# Patient Record
Sex: FEMALE | Race: WHITE | NOT HISPANIC OR LATINO | URBAN - METROPOLITAN AREA
[De-identification: names, ages, dates, MRNs, and addresses within clinical notes are randomized per-mention and may not be internally consistent; named-entity substitution may affect disease eponyms.]

---

## 2022-05-12 ENCOUNTER — OFFICE VISIT (OUTPATIENT)
Dept: PODIATRY | Facility: CLINIC | Age: 52
End: 2022-05-12
Payer: COMMERCIAL

## 2022-05-12 DIAGNOSIS — M72.2 PLANTAR FASCIAL FIBROMATOSIS OF BOTH FEET: Primary | ICD-10-CM

## 2022-05-12 DIAGNOSIS — M79.89 PALPABLE MASS OF SOFT TISSUE OF FOOT: ICD-10-CM

## 2022-05-12 PROCEDURE — 99203 OFFICE O/P NEW LOW 30 MIN: CPT | Mod: 25,S$GLB,, | Performed by: PODIATRIST

## 2022-05-12 PROCEDURE — 1160F RVW MEDS BY RX/DR IN RCRD: CPT | Mod: CPTII,S$GLB,, | Performed by: PODIATRIST

## 2022-05-12 PROCEDURE — 20550 PR INJECT TENDON SHEATH/LIGAMENT: ICD-10-PCS | Mod: RT,S$GLB,, | Performed by: PODIATRIST

## 2022-05-12 PROCEDURE — 1160F PR REVIEW ALL MEDS BY PRESCRIBER/CLIN PHARMACIST DOCUMENTED: ICD-10-PCS | Mod: CPTII,S$GLB,, | Performed by: PODIATRIST

## 2022-05-12 PROCEDURE — 1159F MED LIST DOCD IN RCRD: CPT | Mod: CPTII,S$GLB,, | Performed by: PODIATRIST

## 2022-05-12 PROCEDURE — 20550 NJX 1 TENDON SHEATH/LIGAMENT: CPT | Mod: RT,S$GLB,, | Performed by: PODIATRIST

## 2022-05-12 PROCEDURE — 99999 PR PBB SHADOW E&M-NEW PATIENT-LVL II: ICD-10-PCS | Mod: PBBFAC,,, | Performed by: PODIATRIST

## 2022-05-12 PROCEDURE — 99999 PR PBB SHADOW E&M-NEW PATIENT-LVL II: CPT | Mod: PBBFAC,,, | Performed by: PODIATRIST

## 2022-05-12 PROCEDURE — 99203 PR OFFICE/OUTPT VISIT, NEW, LEVL III, 30-44 MIN: ICD-10-PCS | Mod: 25,S$GLB,, | Performed by: PODIATRIST

## 2022-05-12 PROCEDURE — 1159F PR MEDICATION LIST DOCUMENTED IN MEDICAL RECORD: ICD-10-PCS | Mod: CPTII,S$GLB,, | Performed by: PODIATRIST

## 2022-05-12 RX ORDER — DEXAMETHASONE SODIUM PHOSPHATE 4 MG/ML
4 INJECTION, SOLUTION INTRA-ARTICULAR; INTRALESIONAL; INTRAMUSCULAR; INTRAVENOUS; SOFT TISSUE
Status: DISCONTINUED | OUTPATIENT
Start: 2022-05-12 | End: 2022-05-12 | Stop reason: HOSPADM

## 2022-05-12 RX ORDER — TRIAMCINOLONE ACETONIDE 40 MG/ML
40 INJECTION, SUSPENSION INTRA-ARTICULAR; INTRAMUSCULAR
Status: DISCONTINUED | OUTPATIENT
Start: 2022-05-12 | End: 2022-05-12 | Stop reason: HOSPADM

## 2022-05-12 RX ADMIN — DEXAMETHASONE SODIUM PHOSPHATE 4 MG: 4 INJECTION, SOLUTION INTRA-ARTICULAR; INTRALESIONAL; INTRAMUSCULAR; INTRAVENOUS; SOFT TISSUE at 08:05

## 2022-05-12 RX ADMIN — TRIAMCINOLONE ACETONIDE 40 MG: 40 INJECTION, SUSPENSION INTRA-ARTICULAR; INTRAMUSCULAR at 08:05

## 2022-05-12 NOTE — PROCEDURES
Tendon Sheath    Date/Time: 5/12/2022 8:15 AM  Performed by: Falguni Jones DPM  Authorized by: Falguni Jones DPM     Consent Done?:  Yes (Verbal)  Indications:  Pain  Site marked: the procedure site was marked    Timeout: prior to procedure the correct patient, procedure, and site was verified    Prep: patient was prepped and draped in usual sterile fashion      Local anesthesia used?: Yes    Local anesthetic: 0.75% Marcaine plain.  Anesthetic total (ml):  1    Location:  Foot  Foot joint: Plantar fibroma medial longitudinal arch right.  Ultrasonic guidance for needle placement?: No    Needle size:  25 G  Approach:  Medial  Medications:  40 mg triamcinolone acetonide 40 mg/mL; 4 mg dexamethasone 4 mg/mL  Patient tolerance:  Patient tolerated the procedure well with no immediate complications

## 2022-05-12 NOTE — PROGRESS NOTES
Subjective:       Patient ID: Jada Gates is a 51 y.o. female.    Chief Complaint: Foot Pain (Patient present with 6/10 pain to bilateral plantar arches. She states she has fibromas that have been present for years. They are recently causing increase discomfort and pain. )      HPI: Jada Gates presents to clinic today for 6/10 pain to the right and left foot.  States having large fibromas present and have been present for years.  States these have been quite large in nature within the medial longitudinal arch.  States they have recently causing increased pain.  She states flat hard shoe gears worsen her discomfort. Patient's Primary Care Provider is [facility].     Review of patient's allergies indicates:  No Known Allergies    History reviewed. No pertinent past medical history.    History reviewed. No pertinent family history.    Social History     Socioeconomic History    Marital status:        History reviewed. No pertinent surgical history.    Review of Systems      Objective:   There were no vitals taken for this visit.    No image results found.      Physical Exam  LOWER EXTREMITY PHYSICAL EXAMINATION    VASCULAR: The right DP pulse is 2/4 and the left DP is 2/4. The right PT pulse is 2/4 and the left PT pulse is 2/4. Proximal to distal, warm to warm. No dependent rubor or elevation palor is noted. Capillary refill time is less than 3 seconds. Hair growth is appreciated to the dorsal foot and digits.    NEUROLOGY: Proprioception is intact, bilateral. Sensation to light touch is intact. Negative Tinel's Sign and negative Valleix Sign. No neurological sensations with compression of the area of Calzada's Nerve in the area of the Abductor Hallucis muscle belly.    ORTHOPEDIC:  There is large palpable soft tissue mass present to the plantar aspect the right left foot within the medial longitudinal arch.  These lesions are similar in nature noted bilaterally.  Lesions due consists  of approximately 3.5cm by 2.0 cm.  There are multiple lobulated lesions present.  These were firm to touch.  When activating the windlass mechanism, this does become more prominent.      DERMATOLOGY: No ecchymosis is noted.  Skin is supple, dry and intact. Skin is supple.  No hyperkeratosis noted. No calluses.  No open wounds or ulcerations are noted.  Large palpable fibromas noted bilaterally    Assessment:     1. Plantar fascial fibromatosis of both feet    2. Palpable mass of soft tissue of foot          Plan:     Plantar fascial fibromatosis of both feet  -     Tendon Sheath  -     Tendon Sheath    Palpable mass of soft tissue of foot      Thorough discussion is had with the patient today, concerning the diagnosis, its etiology, and the treatment algorithm at present.    We discussed pathology etiology associated with plantar fibromatosis.  We discussed lesions in detail and discussed treatment options to help alleviate pain discomfort.  We discussed intralesional injection of local steroid and we discussed wide surgical excision of the lesion.  We discussed risk of recurrence even with surgical risk excision in removal.    Patient was interested in steroid injection to the right foot and left foot lesions in hopes that this will help decrease inflammation and strength lesions to a more tolerable comfort level.        No future appointments.

## 2022-05-12 NOTE — PROCEDURES
Tendon Sheath    Date/Time: 5/12/2022 8:15 AM  Performed by: Falguni Jones DPM  Authorized by: Falguni Jones DPM     Consent Done?:  Yes (Verbal)  Indications:  Pain  Site marked: the procedure site was marked    Timeout: prior to procedure the correct patient, procedure, and site was verified    Prep: patient was prepped and draped in usual sterile fashion      Local anesthesia used?: Yes    Local anesthetic: 0.75% Marcaine plain.  Anesthetic total (ml):  1    Location:  Foot  Foot joint: Plantar fibromatosis left foot.  Ultrasonic guidance for needle placement?: No    Needle size:  25 G  Approach:  Medial  Medications:  40 mg triamcinolone acetonide 40 mg/mL; 4 mg dexamethasone 4 mg/mL  Patient tolerance:  Patient tolerated the procedure well with no immediate complications